# Patient Record
Sex: FEMALE | Race: WHITE | HISPANIC OR LATINO | Employment: STUDENT | URBAN - METROPOLITAN AREA
[De-identification: names, ages, dates, MRNs, and addresses within clinical notes are randomized per-mention and may not be internally consistent; named-entity substitution may affect disease eponyms.]

---
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
## 2025-07-24 ENCOUNTER — ANNUAL EXAM (OUTPATIENT)
Dept: OBGYN CLINIC | Facility: CLINIC | Age: 20
End: 2025-07-24
Payer: COMMERCIAL

## 2025-07-24 VITALS
DIASTOLIC BLOOD PRESSURE: 76 MMHG | HEIGHT: 61 IN | WEIGHT: 131 LBS | SYSTOLIC BLOOD PRESSURE: 118 MMHG | BODY MASS INDEX: 24.73 KG/M2

## 2025-07-24 DIAGNOSIS — Z30.011 ENCOUNTER FOR INITIAL PRESCRIPTION OF CONTRACEPTIVE PILLS: Primary | ICD-10-CM

## 2025-07-24 LAB — SL AMB POCT URINE HCG: NORMAL

## 2025-07-24 PROCEDURE — 99203 OFFICE O/P NEW LOW 30 MIN: CPT

## 2025-07-24 PROCEDURE — 81025 URINE PREGNANCY TEST: CPT

## 2025-07-24 RX ORDER — NORETHINDRONE ACETATE AND ETHINYL ESTRADIOL 1MG-20(21)
1 KIT ORAL DAILY
Qty: 84 TABLET | Refills: 0 | Status: SHIPPED | OUTPATIENT
Start: 2025-07-24

## 2025-07-24 NOTE — PROGRESS NOTES
"Subjective      Nicole Aldridge is a 19 y.o. female who presents for contraception counseling.      Menarche: 11/12   LMP: 07/22/25  Periods are regular, every month,  lasting 6-7 days  Denies PMS  Denies dysmenorrhea or menorrhagia  She is sexually active; utilizes condoms  She is in a long distance relationship; boyfriend in [place].   Denies past history of STD/STI  Denies any changes in bowel/bladder habits, pelvic pain, bloating, abdominal pain, N/V, changes in appetite, thyroid disease  Denies history of GYN issues  Denies history of diabetes, HTN, migraine with aura, or blood clots  Denies history of smoking  Denies family history or personal history of blood clots or bleeding disorders    Review of Systems  Pertinent items are noted in HPI.     Objective      /76 (BP Location: Left arm, Patient Position: Sitting, Cuff Size: Standard)   Ht 5' 1\" (1.549 m)   Wt 59.4 kg (131 lb)   LMP 06/22/2025 (Approximate)   BMI 24.75 kg/m²   Physical Exam  Vitals and nursing note reviewed.   Constitutional:       General: She is not in acute distress.     Appearance: Normal appearance.   HENT:      Head: Normocephalic.     Eyes:      Conjunctiva/sclera: Conjunctivae normal.       Cardiovascular:      Rate and Rhythm: Normal rate and regular rhythm.      Heart sounds: Normal heart sounds.   Pulmonary:      Effort: Pulmonary effort is normal.      Breath sounds: Normal breath sounds.   Abdominal:      General: Abdomen is flat.      Palpations: Abdomen is soft.      Tenderness: There is no right CVA tenderness or left CVA tenderness.     Musculoskeletal:         General: Normal range of motion.      Cervical back: Normal range of motion.      Right lower leg: No edema.      Left lower leg: No edema.   Lymphadenopathy:      Cervical: No cervical adenopathy.     Skin:     General: Skin is warm and dry.     Neurological:      Mental Status: She is alert and oriented to person, place, and time.     Psychiatric:         " "Mood and Affect: Mood normal.         Behavior: Behavior normal.         Thought Content: Thought content normal.         Judgment: Judgment normal.           Assessment/Plan        Problem List Items Addressed This Visit    None  Visit Diagnoses         Encounter for initial prescription of contraceptive pills    -  Primary    Relevant Medications    norethindrone-ethinyl estradiol (Junel FE 1/20) 1-20 MG-MCG per tablet    Other Relevant Orders    POCT urine HCG            Contraceptive options were reviewed, including hormonal methods, both combination (pill, patch, vaginal ring) and progesterone-only (pill, Depo Provera and Nexplanon), intrauterine devices (Mirena, Mely and Paraguard), barrier methods (condoms, diaphragm) and male/female sterilization.  The mechanisms, risks, benefits and side effects of all methods were discussed.  All questions have been answered to her satisfaction.    -.Counseled patient on Junel Fe  regarding mechanism of action, risks, side effects, and benefits.  -Discussed how BRENDA prevents pregnancy.  -Reviewed the importance of taking the pill every day at the same time to prevent pregnancy and BTB.  -Advised safe sex practices with continued use of condoms to prevent STDs.   -Reviewed that antibiotic use can decrease the effectiveness of the BRENDA and she should be advised to utilize a back up method during antibiotic therapy.  -She is aware of possible side effect including, but not limited to, headaches, acne, bloating, irregular menses, mood changes and breast tenderness.   -She is aware of the risk of VTE and/or increased blood pressure with estrogen use. Reviewed ACHES warning signs and when to seek immediate attention at the ED.   -She is aware to start the pill with her next menstrual cycle (Sunday start-up). She should continue to use a \"back-up\" method of birth control for the first month to prevent pregnancy.     All questions answered. RTO in November (home from [facility]) for " APE and pill check.

## (undated) DEVICE — GLOVE SRG BIOGEL 7

## (undated) DEVICE — POV-IOD SWAB STICKS

## (undated) DEVICE — 3M™ STERI-STRIP™ REINFORCED ADHESIVE SKIN CLOSURES, R1547, 1/2 IN X 4 IN (12 MM X 100 MM), 6 STRIPS/ENVELOPE: Brand: 3M™ STERI-STRIP™

## (undated) DEVICE — TUBING SUCTION 5MM X 12 FT

## (undated) DEVICE — ELECTRODE NEEDLE MEGAFINE 2IN E-Z CLEAN MEGADYNE -0118

## (undated) DEVICE — PAD GROUNDING ADULT

## (undated) DEVICE — SPONGE STICK WITH PVP-I: Brand: KENDALL

## (undated) DEVICE — 3M™ STERI-STRIP™ COMPOUND BENZOIN TINCTURE 40 BAGS/CARTON 4 CARTONS/CASE C1544: Brand: 3M™ STERI-STRIP™

## (undated) DEVICE — PENCIL ELECTROSURG E-Z CLEAN -0035H

## (undated) DEVICE — TIBURON SPLIT SHEET: Brand: CONVERTORS

## (undated) DEVICE — BETHLEHEM UNIVERSAL OUTPATIENT: Brand: CARDINAL HEALTH

## (undated) DEVICE — NEEDLE 27 G X 1 1/4

## (undated) DEVICE — INTENDED FOR TISSUE SEPARATION, AND OTHER PROCEDURES THAT REQUIRE A SHARP SURGICAL BLADE TO PUNCTURE OR CUT.: Brand: BARD-PARKER ® CARBON RIB-BACK BLADES

## (undated) DEVICE — VIAL DECANTER